# Patient Record
Sex: MALE
[De-identification: names, ages, dates, MRNs, and addresses within clinical notes are randomized per-mention and may not be internally consistent; named-entity substitution may affect disease eponyms.]

---

## 2021-08-05 DIAGNOSIS — Z00.00 ENCOUNTER FOR GENERAL ADULT MEDICAL EXAMINATION W/OUT ABNORMAL FINDINGS: ICD-10-CM

## 2021-08-12 ENCOUNTER — APPOINTMENT (OUTPATIENT)
Dept: ORTHOPEDIC SURGERY | Facility: CLINIC | Age: 31
End: 2021-08-12
Payer: OTHER MISCELLANEOUS

## 2021-08-12 VITALS
DIASTOLIC BLOOD PRESSURE: 81 MMHG | HEIGHT: 70 IN | WEIGHT: 190 LBS | SYSTOLIC BLOOD PRESSURE: 144 MMHG | HEART RATE: 67 BPM | BODY MASS INDEX: 27.2 KG/M2

## 2021-08-12 PROCEDURE — 73562 X-RAY EXAM OF KNEE 3: CPT | Mod: LT

## 2021-08-12 PROCEDURE — 99072 ADDL SUPL MATRL&STAF TM PHE: CPT

## 2021-08-12 PROCEDURE — 99203 OFFICE O/P NEW LOW 30 MIN: CPT

## 2021-09-10 NOTE — HISTORY OF PRESENT ILLNESS
[de-identified] : 31 year old male s/p left knee medial meniscus root repair 2019 by Dr. Kirk ([facility]), presents with continued left knee pain since 2018. No new injury reported. The pain is constant worse with twisting and standing from seated position, deep flexion. He is not taking pain medication. C/O stiffness and buckling. Denies locking, catching,  numbness or tingling. \par \par The patient's past medical history, past surgical history, medications and allergies were reviewed by me today with the patient and documented accordingly. In addition, the patient's family and social history, which were noncontributory to this visit, were reviewed also.

## 2021-09-10 NOTE — ADDENDUM
[FreeTextEntry1] : This note was written by Christal Whitehead on 08/12/2021 acting solely as a scribe for Dr. Flex Royal.\par \par All medical record entries made by the Scribe were at my, Dr. Flex Royal, direction and personally dictated by me on 08/12/2021. I have personally reviewed the chart and agree that the record accurately reflects my personal performance of the history, physical exam, assessment and plan.

## 2021-09-10 NOTE — DISCUSSION/SUMMARY
[de-identified] : 30 y/o male with left knee pain. \par \par Patient is status post medial meniscus root repair in 2019 but continues to report pain through the medial aspect of the joint.  Patient is seeking a second opinion regarding his findings, and consideration of alternative treatments.  A discussion was had with the patient regarding short-term and long-term outcomes regarding root injuries of the meniscus.  Given he has undergone a root repair, we discussed consideration and concern of chondral surfaces within the medial joint, as well as any meniscal degradation/extrusion.  Given that he has been symptomatic for multiple years, we would consider additional imaging at this time to further evaluate for root healing/dysfunction.\par \par We briefly discussed consideration of additional treatment that may include arthroscopic evaluation/treatment/debridement, versus continued conservative management if MRI shows no significant dysfunction to the meniscus and medial joint.  Also discussed consideration of alternative treatments including bracing/osteotomies etc. for progressive medial joint pains status post arthroscopy.\par \par Patient voiced understanding, and is electing to proceed with advanced imaging at this time.  MRI prescription provided.  Until then, recommend conservative/supportive care.\par \par Follow-up after MRI

## 2021-09-10 NOTE — PHYSICAL EXAM
[de-identified] : Oriented to time, place, person\par Mood: Normal\par Affect: Normal\par Appearance: Healthy, well appearing, no acute distress.\par Gait: Normal\par Assistive Devices: None\par \par Left Knee Exam:\par \par Skin: Clean, dry, intact\par Inspection: No obvious malalignment, no masses, no swelling, no effusion\par Pulses: 2+ DP/PT pulses \par ROM: 0-135 degrees of flexion. No pain with deep knee flexion/extension.\par Tenderness: + MJLT. No LJLT. No pain over the patella facets. No pain to the quadriceps tendon. No pain to the patella tendon. No posterior knee tenderness.\par Stability: Stable to varus, valgus. Negative Lachman testing. Negative anterior drawer, negative posterior drawer.\par Strength: 5/5 Q/H/TA/GS/EHL, without atrophy\par Neuro: Intact to light touch throughout, DTRs normal\par Additional Tests: Negative Dov's test, Negative patellar grind test  [de-identified] : The following radiographs were ordered and read by me during this patients visit. I reviewed each radiograph in detail with the patient and discussed the findings as highlighted below. \par \par 4 views of the left knee were obtained today, 08/12/2021, that show no acute fracture or dislocation. There is no medial, no lateral and no patellofemoral degenerative changes seen. There is no significant malalignment.  Hardware medial knee consistent with root repair medial meniscus

## 2021-12-16 ENCOUNTER — TRANSCRIPTION ENCOUNTER (OUTPATIENT)
Age: 31
End: 2021-12-16

## 2022-01-04 ENCOUNTER — APPOINTMENT (OUTPATIENT)
Dept: ORTHOPEDIC SURGERY | Facility: CLINIC | Age: 32
End: 2022-01-04
Payer: OTHER MISCELLANEOUS

## 2022-01-04 DIAGNOSIS — M25.562 PAIN IN LEFT KNEE: ICD-10-CM

## 2022-01-04 PROCEDURE — 99072 ADDL SUPL MATRL&STAF TM PHE: CPT

## 2022-01-04 PROCEDURE — 99214 OFFICE O/P EST MOD 30 MIN: CPT

## 2022-01-05 PROBLEM — M25.562 KNEE PAIN, LEFT: Status: ACTIVE | Noted: 2021-09-10

## 2022-01-11 ENCOUNTER — TRANSCRIPTION ENCOUNTER (OUTPATIENT)
Age: 32
End: 2022-01-11

## 2022-01-14 ENCOUNTER — TRANSCRIPTION ENCOUNTER (OUTPATIENT)
Age: 32
End: 2022-01-14

## 2024-12-18 ENCOUNTER — APPOINTMENT (OUTPATIENT)
Dept: HUMAN REPRODUCTION | Facility: CLINIC | Age: 34
End: 2024-12-18

## 2025-01-06 ENCOUNTER — APPOINTMENT (OUTPATIENT)
Dept: HUMAN REPRODUCTION | Facility: CLINIC | Age: 35
End: 2025-01-06
Payer: COMMERCIAL

## 2025-01-06 PROCEDURE — 36415 COLL VENOUS BLD VENIPUNCTURE: CPT
